# Patient Record
Sex: FEMALE | Race: WHITE | NOT HISPANIC OR LATINO | Employment: FULL TIME | URBAN - METROPOLITAN AREA
[De-identification: names, ages, dates, MRNs, and addresses within clinical notes are randomized per-mention and may not be internally consistent; named-entity substitution may affect disease eponyms.]

---

## 2019-07-26 ENCOUNTER — HOSPITAL ENCOUNTER (OUTPATIENT)
Dept: CARDIOLOGY | Facility: HOSPITAL | Age: 24
Discharge: HOME | End: 2019-07-26
Attending: NURSE PRACTITIONER

## 2021-11-08 ENCOUNTER — OUTSIDE FACILITY SERVICE (OUTPATIENT)
Dept: CARDIOLOGY | Facility: CLINIC | Age: 26
End: 2021-11-08

## 2021-11-08 PROCEDURE — OUTSIDEPOS PR OUTSIDE POS PLACEHOLDER: Performed by: INTERNAL MEDICINE

## 2022-01-12 ENCOUNTER — OFFICE VISIT (OUTPATIENT)
Dept: INTERNAL MEDICINE | Facility: CLINIC | Age: 27
End: 2022-01-12

## 2022-01-12 VITALS
BODY MASS INDEX: 29.77 KG/M2 | SYSTOLIC BLOOD PRESSURE: 113 MMHG | TEMPERATURE: 98.2 F | HEART RATE: 123 BPM | DIASTOLIC BLOOD PRESSURE: 88 MMHG | WEIGHT: 168 LBS | HEIGHT: 63 IN

## 2022-01-12 DIAGNOSIS — F33.1 MODERATE EPISODE OF RECURRENT MAJOR DEPRESSIVE DISORDER: Primary | ICD-10-CM

## 2022-01-12 DIAGNOSIS — I10 PRIMARY HYPERTENSION: ICD-10-CM

## 2022-01-12 DIAGNOSIS — R23.2 FACIAL FLUSHING: ICD-10-CM

## 2022-01-12 DIAGNOSIS — F41.9 ANXIETY: ICD-10-CM

## 2022-01-12 PROCEDURE — 99213 OFFICE O/P EST LOW 20 MIN: CPT | Performed by: NURSE PRACTITIONER

## 2022-01-12 RX ORDER — HYDROXYZINE HYDROCHLORIDE 25 MG/1
25 TABLET, FILM COATED ORAL 3 TIMES DAILY PRN
Qty: 90 TABLET | Refills: 1 | Status: SHIPPED | OUTPATIENT
Start: 2022-01-12

## 2022-01-12 RX ORDER — HYDRALAZINE HYDROCHLORIDE 10 MG/1
10 TABLET, FILM COATED ORAL 3 TIMES DAILY PRN
Qty: 90 TABLET | Refills: 1 | Status: SHIPPED | OUTPATIENT
Start: 2022-01-12 | End: 2022-01-12 | Stop reason: ALTCHOICE

## 2022-01-12 NOTE — PROGRESS NOTES
New Patient Office Visit      Patient Name: Sheyla Vargas  : 1995   MRN: [id number]   Care Team: Patient Care Team:  Brandee Alba APRN as PCP - General (Nurse Practitioner)    Chief Complaint:    Chief Complaint   Patient presents with   • medication review     establishing care       History of Present Illness: Sheyla Vargas is a 26 y.o. female who is here today to establish care.  She has pertinent medical history significant for anxiety, depression, hypertension with associated headaches and facial flushing.    Started job with [employer] approximately 4 to 6 months ago.  Was previously in [place], had PCP at [place]. Wanting to change providers due to convenience of location.    Last visit with previous PCP was 10/21.     Depression/anxiety- has been sleeping well over last several weeks. Worried about things.   Working at hospital and with covid, lots of extra shifts, working short-handed.  Tried sertraline in past, had weight gain and didn't work well to manage symptoms.      Feels anxious during day but staying busy at work/life.  Feel symptoms more so of anxious at night while trying to sleep.  Also has associated palpitations.  She has seen cardiology in the past with normal echo and EKG.    Hasnt tried taking any medications other than prescribed to treat sleep/anxiousness.    Headaches- ongoing, chronic issue.  Usually starts around noon time also correlates with elevated BP that starts around that time as well.      HTN- takes norvasc 5mg nightly and HCTZ 25 mg in mornings.  BP seems to runs okay in the morning time, then increases throughout the day, usually elevated around noon and even higher in the afternoons.      Facial flushing- feels symptoms are worse.  Every day occurrence, starts even in the mornings, goes throughout the day.  Worse after eating.  Admits she has discontinued all caffeine since the start of new year.  Trying to lose weight/changing dietary habits.  Trying to  "add some exercise regimen but not so routine yet.     Had blood work with previous PCP (cesar, sed rate, RF) to rule out auto-immune.           Subjective      Review of Systems:   Review of Systems   Skin:        Positive for facial flushing   Neurological: Positive for headache.   Psychiatric/Behavioral: The patient is nervous/anxious.        Past Medical History:   Past Medical History:   Diagnosis Date   • Anxiety    • Depression    • Hypertension        Past Surgical History:   Past Surgical History:   Procedure Laterality Date   • CHOLECYSTECTOMY  10/31/2017       Family History:   Family History   Problem Relation Age of Onset   • Diabetes Mother    • Obesity Mother    • Obesity Father    • Heart attack Maternal Grandmother        Social History:   Social History     Socioeconomic History   • Marital status: Single   Tobacco Use   • Smoking status: Never Smoker   • Smokeless tobacco: Never Used   Substance and Sexual Activity   • Alcohol use: Never   • Drug use: Never       Tobacco History:   Social History     Tobacco Use   Smoking Status Never Smoker   Smokeless Tobacco Never Used       Medications:     Current Outpatient Medications:   •  amLODIPine (NORVASC) 5 MG tablet, Take 5 mg by mouth Daily., Disp: , Rfl:   •  FLUoxetine (PROzac) 40 MG capsule, , Disp: , Rfl:   •  hydroCHLOROthiazide (HYDRODIURIL) 25 MG tablet, , Disp: , Rfl:   •  triamcinolone (KENALOG) 0.1 % ointment, As Needed., Disp: , Rfl:   •  hydrOXYzine (ATARAX) 25 MG tablet, Take 1 tablet by mouth 3 (Three) Times a Day As Needed for Anxiety., Disp: 90 tablet, Rfl: 1    Allergies:   No Known Allergies    Objective     Physical Exam:  Vital Signs:   Vitals:    01/12/22 1113   BP: 113/88   BP Location: Left arm   Patient Position: Sitting   Cuff Size: Adult   Pulse: (!) 123   Temp: 98.2 °F (36.8 °C)   TempSrc: Temporal   Weight: 76.2 kg (168 lb)   Height: 160 cm (62.99\")   PainSc: 0-No pain     Body mass index is 29.77 kg/m².     Physical " Exam  Vitals and nursing note reviewed.   Constitutional:       General: She is not in acute distress.  HENT:      Head: Normocephalic and atraumatic.   Eyes:      General: No scleral icterus.     Pupils: Pupils are equal, round, and reactive to light.   Neck:      Vascular: No carotid bruit.   Cardiovascular:      Rate and Rhythm: Normal rate and regular rhythm.   Pulmonary:      Effort: Pulmonary effort is normal. No respiratory distress.      Breath sounds: Normal breath sounds. No wheezing.   Musculoskeletal:      Cervical back: Neck supple. No tenderness.      Right lower leg: No edema.      Left lower leg: No edema.   Skin:     Comments: Bilateral cheeks, nose and chin area florentino red without any skin lesions noted.   Neurological:      General: No focal deficit present.      Mental Status: She is alert.   Psychiatric:         Thought Content: Thought content normal.         Judgment: Judgment normal.      Comments: Patient is conversant but quiet.         Assessment / Plan      Assessment/Plan:   Problems Addressed This Visit    ICD-10-CM ICD-9-CM   1. Moderate episode of recurrent major depressive disorder (HCC)  F33.1 296.32   2. Anxiety  F41.9 300.00   3. Facial flushing  R23.2 782.62   4. Primary hypertension  I10 401.9     New patient to establish    Hypertension with associated headache  -Appears to be possibly related to her anxiety as her BP and headache spike midday  -Continue Norvasc 5 mg daily, HCTZ 25 mg daily  -Monitor BP closely, record and bring log to next appointment    Anxiety/ depression  -Continue Prozac 40 mg daily  -Adding hydroxyzine 25 mg 3 times daily as needed as this may help control her anxiousness as well as have some effect on her blood pressure    Facial  -Appears SEJAL, RF and sed rate within normal range  -Questionable if this is rosacea but may also be a result of her anxiety  - may consider dermatology referral in future if this persists despite gaining more optimal control of  depression/anxiety and hypertension            Plan of care reviewed with patient at the conclusion of today's visit. Education was provided regarding diagnosis and management.  Patient verbalizes understanding of and agreement with management plan.    There are no Patient Instructions on file for this visit.    Follow Up:   Return in about 1 month (around 2/12/2022).          MICHAEL Thomas  [facility] [address] [facility]    Please note that portions of this note were completed with a voice recognition program.

## 2022-01-12 NOTE — PATIENT INSTRUCTIONS
"[email or web address]">   Generalized Anxiety Disorder, Adult  Generalized anxiety disorder (NANDO) is a mental health condition. Unlike normal worries, anxiety related to NANDO is not triggered by a specific event. These worries do not fade or get better with time. NANDO interferes with relationships, work, and school.  NANDO symptoms can vary from mild to severe. People with severe NANDO can have intense waves of anxiety with physical symptoms that are similar to panic attacks.  What are the causes?  The exact cause of NANDO is not known, but the following are believed to have an impact:  · Differences in natural brain chemicals.  · Genes passed down from parents to children.  · Differences in the way threats are perceived.  · Development during childhood.  · Personality.  What increases the risk?  The following factors may make you more likely to develop this condition:  · Being female.  · Having a family history of anxiety disorders.  · Being very shy.  · Experiencing very stressful life events, such as the death of a loved one.  · Having a very stressful family environment.  What are the signs or symptoms?  People with NANDO often worry excessively about many things in their lives, such as their health and family. Symptoms may also include:  · Mental and emotional symptoms:  ? Worrying excessively about natural disasters.  ? Fear of being late.  ? Difficulty concentrating.  ? Fears that others are judging your performance.  · Physical symptoms:  ? Fatigue.  ? Headaches, muscle tension, muscle twitches, trembling, or feeling shaky.  ? Feeling like your heart is pounding or beating very fast.  ? Feeling out of breath or like you cannot take a deep breath.  ? Having trouble falling asleep or staying asleep, or experiencing restlessness.  ? Sweating.  ? Nausea, diarrhea, or irritable bowel syndrome (IBS).  · Behavioral symptoms:  ? Experiencing erratic moods or irritability.  ? Avoidance of new situations.  ? Avoidance of " people.  ? Extreme difficulty making decisions.  How is this diagnosed?  This condition is diagnosed based on your symptoms and medical history. You will also have a physical exam. Your health care provider may perform tests to rule out other possible causes of your symptoms.  To be diagnosed with NANDO, a person must have anxiety that:  · Is out of his or her control.  · Affects several different aspects of his or her life, such as work and relationships.  · Causes distress that makes him or her unable to take part in normal activities.  · Includes at least three symptoms of NANDO, such as restlessness, fatigue, trouble concentrating, irritability, muscle tension, or sleep problems.  Before your health care provider can confirm a diagnosis of NANDO, these symptoms must be present more days than they are not, and they must last for 6 months or longer.  How is this treated?  This condition may be treated with:  · Medicine. Antidepressant medicine is usually prescribed for long-term daily control. Anti-anxiety medicines may be added in severe cases, especially when panic attacks occur.  · Talk therapy (psychotherapy). Certain types of talk therapy can be helpful in treating NANDO by providing support, education, and guidance. Options include:  ? Cognitive behavioral therapy (CBT). People learn coping skills and self-calming techniques to ease their physical symptoms. They learn to identify unrealistic thoughts and behaviors and to replace them with more appropriate thoughts and behaviors.  ? Acceptance and commitment therapy (ACT). This treatment teaches people how to be mindful as a way to cope with unwanted thoughts and feelings.  ? Biofeedback. This process trains you to manage your body's response (physiological response) through breathing techniques and relaxation methods. You will work with a therapist while machines are used to monitor your physical symptoms.  · Stress management techniques. These include yoga,  meditation, and exercise.  A mental health specialist can help determine which treatment is best for you. Some people see improvement with one type of therapy. However, other people require a combination of therapies.  Follow these instructions at home:  Lifestyle  · Maintain a consistent routine and schedule.  · Anticipate stressful situations. Create a plan, and allow extra time to work with your plan.  · Practice stress management or self-calming techniques that you have learned from your therapist or your health care provider.  General instructions  · Take over-the-counter and prescription medicines only as told by your health care provider.  · Understand that you are likely to have setbacks. Accept this and be kind to yourself as you persist to take better care of yourself.  · Recognize and accept your accomplishments, even if you  them as small.  · Keep all follow-up visits as told by your health care provider. This is important.  Contact a health care provider if:  · Your symptoms do not get better.  · Your symptoms get worse.  · You have signs of depression, such as:  ? A persistently sad or irritable mood.  ? Loss of enjoyment in activities that used to bring you christi.  ? Change in weight or eating.  ? Changes in sleeping habits.  ? Avoiding friends or family members.  ? Loss of energy for normal tasks.  ? Feelings of guilt or worthlessness.  Get help right away if:  · You have serious thoughts about hurting yourself or others.  If you ever feel like you may hurt yourself or others, or have thoughts about taking your own life, get help right away. Go to your nearest emergency department or:  · Call your local emergency services (781 in the [place]  · Call a suicide crisis helpline, such as the National Suicide Prevention Lifeline at 1-454.620.8066. This is open 24 hours a day in the [place]  · Text the Crisis Text Line at 357216 (in the [place]  Summary  · Generalized anxiety disorder (NANDO) is a mental  health condition that involves worry that is not triggered by a specific event.  · People with NANDO often worry excessively about many things in their lives, such as their health and family.  · NANDO may cause symptoms such as restlessness, trouble concentrating, sleep problems, frequent sweating, nausea, diarrhea, headaches, and trembling or muscle twitching.  · A mental health specialist can help determine which treatment is best for you. Some people see improvement with one type of therapy. However, other people require a combination of therapies.  This information is not intended to replace advice given to you by your health care provider. Make sure you discuss any questions you have with your health care provider.  Document Revised: 10/07/2020 Document Reviewed: 10/07/2020  Elsevier Patient Education © 2021 [employer]

## 2022-01-19 ENCOUNTER — PATIENT EDUCATION (SURGERY INSTRUCTIONS) (OUTPATIENT)
Dept: INTERNAL MEDICINE | Facility: CLINIC | Age: 27
End: 2022-01-19

## 2022-01-19 ENCOUNTER — PATIENT ROUNDING (BHMG ONLY) (OUTPATIENT)
Dept: INTERNAL MEDICINE | Facility: CLINIC | Age: 27
End: 2022-01-19

## 2022-01-19 NOTE — PROGRESS NOTES
Called patient & had the following conversation:      My name is Shelby Salas & I’m the practice manager here at [facility] with Eleonora Alba's office.    I’m calling as I see you were a new patient with our practice last week & I wanted to officially welcome you & make sure everything went smoothly for you.    Pt stated everything was great & she really liked Eleonora.  Staff interactions were also good.    She did not need anything from me so I told her we would see her back next month but she can call or send a portal if she needs anything before then.

## 2022-02-23 ENCOUNTER — OFFICE VISIT (OUTPATIENT)
Dept: INTERNAL MEDICINE | Facility: CLINIC | Age: 27
End: 2022-02-23

## 2022-02-23 ENCOUNTER — LAB (OUTPATIENT)
Dept: LAB | Facility: HOSPITAL | Age: 27
End: 2022-02-23

## 2022-02-23 VITALS
DIASTOLIC BLOOD PRESSURE: 84 MMHG | HEART RATE: 105 BPM | WEIGHT: 165 LBS | BODY MASS INDEX: 29.23 KG/M2 | OXYGEN SATURATION: 99 % | SYSTOLIC BLOOD PRESSURE: 120 MMHG | TEMPERATURE: 98 F | HEIGHT: 63 IN

## 2022-02-23 DIAGNOSIS — R23.2 FACIAL FLUSHING: ICD-10-CM

## 2022-02-23 DIAGNOSIS — R19.7 DIARRHEA, UNSPECIFIED TYPE: ICD-10-CM

## 2022-02-23 DIAGNOSIS — R51.9 CHRONIC NONINTRACTABLE HEADACHE, UNSPECIFIED HEADACHE TYPE: ICD-10-CM

## 2022-02-23 DIAGNOSIS — I10 PRIMARY HYPERTENSION: ICD-10-CM

## 2022-02-23 DIAGNOSIS — R19.7 DIARRHEA, UNSPECIFIED TYPE: Primary | ICD-10-CM

## 2022-02-23 DIAGNOSIS — F41.9 ANXIETY: ICD-10-CM

## 2022-02-23 DIAGNOSIS — F33.1 MODERATE EPISODE OF RECURRENT MAJOR DEPRESSIVE DISORDER: ICD-10-CM

## 2022-02-23 DIAGNOSIS — G89.29 CHRONIC NONINTRACTABLE HEADACHE, UNSPECIFIED HEADACHE TYPE: ICD-10-CM

## 2022-02-23 LAB
ALBUMIN SERPL-MCNC: 4.6 G/DL (ref 3.5–5.2)
ALBUMIN/GLOB SERPL: 2.3 G/DL
ALP SERPL-CCNC: 60 U/L (ref 39–117)
ALT SERPL W P-5'-P-CCNC: 12 U/L (ref 1–33)
ANION GAP SERPL CALCULATED.3IONS-SCNC: 12.7 MMOL/L (ref 5–15)
AST SERPL-CCNC: 14 U/L (ref 1–32)
BASOPHILS # BLD AUTO: 0.08 10*3/MM3 (ref 0–0.2)
BASOPHILS NFR BLD AUTO: 0.8 % (ref 0–1.5)
BILIRUB SERPL-MCNC: 0.5 MG/DL (ref 0–1.2)
BUN SERPL-MCNC: 12 MG/DL (ref 6–20)
BUN/CREAT SERPL: 15.4 (ref 7–25)
CALCIUM SPEC-SCNC: 9.6 MG/DL (ref 8.6–10.5)
CHLORIDE SERPL-SCNC: 102 MMOL/L (ref 98–107)
CO2 SERPL-SCNC: 22.3 MMOL/L (ref 22–29)
CREAT SERPL-MCNC: 0.78 MG/DL (ref 0.57–1)
DEPRECATED RDW RBC AUTO: 43.3 FL (ref 37–54)
EOSINOPHIL # BLD AUTO: 0.02 10*3/MM3 (ref 0–0.4)
EOSINOPHIL NFR BLD AUTO: 0.2 % (ref 0.3–6.2)
ERYTHROCYTE [DISTWIDTH] IN BLOOD BY AUTOMATED COUNT: 12.8 % (ref 12.3–15.4)
GFR SERPL CREATININE-BSD FRML MDRD: 91 ML/MIN/1.73
GLOBULIN UR ELPH-MCNC: 2 GM/DL
GLUCOSE SERPL-MCNC: 79 MG/DL (ref 65–99)
HCT VFR BLD AUTO: 45.8 % (ref 34–46.6)
HGB BLD-MCNC: 14.9 G/DL (ref 12–15.9)
IMM GRANULOCYTES # BLD AUTO: 0.02 10*3/MM3 (ref 0–0.05)
IMM GRANULOCYTES NFR BLD AUTO: 0.2 % (ref 0–0.5)
LYMPHOCYTES # BLD AUTO: 1.47 10*3/MM3 (ref 0.7–3.1)
LYMPHOCYTES NFR BLD AUTO: 15 % (ref 19.6–45.3)
MCH RBC QN AUTO: 29.9 PG (ref 26.6–33)
MCHC RBC AUTO-ENTMCNC: 32.5 G/DL (ref 31.5–35.7)
MCV RBC AUTO: 91.8 FL (ref 79–97)
MONOCYTES # BLD AUTO: 0.67 10*3/MM3 (ref 0.1–0.9)
MONOCYTES NFR BLD AUTO: 6.8 % (ref 5–12)
NEUTROPHILS NFR BLD AUTO: 7.56 10*3/MM3 (ref 1.7–7)
NEUTROPHILS NFR BLD AUTO: 77 % (ref 42.7–76)
NRBC BLD AUTO-RTO: 0 /100 WBC (ref 0–0.2)
PLATELET # BLD AUTO: 345 10*3/MM3 (ref 140–450)
PMV BLD AUTO: 11.3 FL (ref 6–12)
POTASSIUM SERPL-SCNC: 4.5 MMOL/L (ref 3.5–5.2)
PROT SERPL-MCNC: 6.6 G/DL (ref 6–8.5)
RBC # BLD AUTO: 4.99 10*6/MM3 (ref 3.77–5.28)
SODIUM SERPL-SCNC: 137 MMOL/L (ref 136–145)
WBC NRBC COR # BLD: 9.82 10*3/MM3 (ref 3.4–10.8)

## 2022-02-23 PROCEDURE — 80053 COMPREHEN METABOLIC PANEL: CPT

## 2022-02-23 PROCEDURE — 99213 OFFICE O/P EST LOW 20 MIN: CPT | Performed by: NURSE PRACTITIONER

## 2022-02-23 PROCEDURE — 85025 COMPLETE CBC W/AUTO DIFF WBC: CPT

## 2022-02-23 RX ORDER — AMLODIPINE BESYLATE 10 MG/1
10 TABLET ORAL DAILY
Qty: 30 TABLET | Refills: 3 | Status: SHIPPED | OUTPATIENT
Start: 2022-02-23

## 2022-02-23 RX ORDER — RIZATRIPTAN BENZOATE 10 MG/1
10 TABLET ORAL ONCE AS NEEDED
Qty: 15 TABLET | Refills: 2 | Status: SHIPPED | OUTPATIENT
Start: 2022-02-23

## 2022-02-23 NOTE — PROGRESS NOTES
Follow Up Office Visit      Patient Name: Sheyla Vargas  : 1995   MRN: [id number]   Care Team: Patient Care Team:  Brandee Alba APRN as PCP - General (Nurse Practitioner)    Chief Complaint:    Chief Complaint   Patient presents with   • Follow-up       History of Present Illness: Sheyla Vargas is a 26 y.o. female with pertinent medical history significant for hypertension, facial flushing chronic headaches, depression and anxiety.  She presents today for follow-up of these conditions as well as new issue of frequent diarrhea    Headaches, sleep disturbance, elevated blood pressure, facial flushing-at last visit, she was prescribed course of hydroxyzine 25 mg 3 times daily for what was thought to be elevated blood pressure, tension headaches, facial flushing sleep disturbance related to stress and anxiety with her new work situation.    She tells me today that her sleep is much improved with hydroxyzine, she is taking it nightly and sleeping on average of 7 to 8 hours restfully.  However, she is unable to take doses throughout the day as it causes her to feel drowsy.    She continues to experience almost daily headaches in the afternoon with associated facial flushing.  She admits that she has not been checking her blood pressure routinely.  She is continuing to take Norvasc 5 mg daily and after chlorothiazide 25 mg daily.  States that she takes ibuprofen and Tylenol without relief of her headaches.  Most days are rated 6-7/10 on pain scale but occasionally she has some episodic headaches that reached 8-10.  On most days, she does report having light sensitivity.  Noted that caffeine was discontinued but there was no improvement in the symptoms.    She reports that she does feel a great deal of stress and some ongoing anxiousness at work but it is improving overall, not worse at this time.      Diarrhea-noted to be status post cholecystectomy .  However, reports that prior to Norbert, she  has started having very frequent liquid stools, approximately 4-5 times daily.  She is trying to adjust her diet but this has not improved her symptoms.  Reports that she feels cramping and need to have BM with almost every food intake.  She has trialed medications over-the-counter such as Pepto without any relief of symptoms. Reports stools seem oily.  She denies having any viral illnesses or concerns for food poisoning.  Denies associated nausea, vomiting and or fever, chills.         Subjective      Review of Systems:   Review of Systems   Constitutional: Negative for chills and fever.   Respiratory: Negative for shortness of breath.    Cardiovascular: Negative for chest pain and palpitations.   Gastrointestinal: Positive for abdominal pain (Cramping) and diarrhea.   Neurological: Positive for headache.   Psychiatric/Behavioral: Positive for stress.       I have reviewed and the following portions of the patient's history were updated as appropriate: past family history, past medical history, past social history, past surgical history and problem list.    Medications:     Current Outpatient Medications:   •  FLUoxetine (PROzac) 40 MG capsule, , Disp: , Rfl:   •  hydroCHLOROthiazide (HYDRODIURIL) 25 MG tablet, , Disp: , Rfl:   •  hydrOXYzine (ATARAX) 25 MG tablet, Take 1 tablet by mouth 3 (Three) Times a Day As Needed for Anxiety., Disp: 90 tablet, Rfl: 1  •  triamcinolone (KENALOG) 0.1 % ointment, As Needed., Disp: , Rfl:   •  amLODIPine (Norvasc) 10 MG tablet, Take 1 tablet by mouth Daily., Disp: 30 tablet, Rfl: 3  •  rizatriptan (Maxalt) 10 MG tablet, Take 1 tablet by mouth 1 (One) Time As Needed for Migraine for up to 1 dose. May repeat in 2 hours if needed, Disp: 15 tablet, Rfl: 2    Allergies:   No Known Allergies    Objective     Physical Exam:  Vital Signs:   Vitals:    02/23/22 1037   BP: 120/84   BP Location: Left arm   Patient Position: Sitting   Cuff Size: Adult   Pulse: 105   Temp: 98 °F (36.7 °C)  "  TempSrc: Temporal   SpO2: 99%   Weight: 74.8 kg (165 lb)   Height: 160 cm (62.99\")   PainSc: 0-No pain     Body mass index is 29.24 kg/m².     Physical Exam  Vitals and nursing note reviewed.   Constitutional:       General: She is not in acute distress.     Appearance: Normal appearance.   HENT:      Head: Normocephalic and atraumatic.      Comments: Patient wearing facial mask  Eyes:      General: No scleral icterus.     Extraocular Movements: Extraocular movements intact.      Conjunctiva/sclera: Conjunctivae normal.      Pupils: Pupils are equal, round, and reactive to light.   Cardiovascular:      Rate and Rhythm: Normal rate and regular rhythm.   Pulmonary:      Effort: Pulmonary effort is normal. No respiratory distress.   Abdominal:      General: Bowel sounds are normal.   Musculoskeletal:      Right lower leg: No edema.      Left lower leg: No edema.   Skin:     General: Skin is warm and dry.   Neurological:      Mental Status: She is alert.      Cranial Nerves: No cranial nerve deficit.   Psychiatric:         Mood and Affect: Mood normal.         Thought Content: Thought content normal.         Judgment: Judgment normal.         Assessment / Plan      Assessment/Plan:   Problems Addressed This Visit    ICD-10-CM ICD-9-CM   1. Diarrhea, unspecified type  R19.7 787.91   2. Moderate episode of recurrent major depressive disorder (HCC)  F33.1 296.32   3. Anxiety  F41.9 300.00   4. Facial flushing  R23.2 782.62   5. Primary hypertension  I10 401.9   6. Chronic nonintractable headache, unspecified headache type  R51.9 784.0    G89.29       Diarrhea  -Status post: 2013 but patient states symptoms changed  -Stool culture/studies with O&P  -CBC CMP  -May need to consider allergy testing if above tests are within normal limits    Hypertension  Facial flushing  Chronic headaches  -Increasing Norvasc to 10 mg daily, continue hydrochlorothiazide 25 mg daily  -Trial of Maxalt 10 mg x 1 dose and repeat in 2 hours as " "needed  -Advised patient to restart checking her blood pressure at the onset of headache and when headaches are severe.  Questionable whether her pain is causing elevated BPs versus elevated BP causing headaches  -I do feel that her symptoms have component of relation to emotional stress and anxiety    Sleep disturbance  -May continue hydroxyzine 25 mg nightly    Plan of care reviewed with patient at the conclusion of today's visit. Education was provided regarding diagnosis and management.  Patient verbalizes understanding of and agreement with management plan.    Patient Instructions   Hypertension, Adult  High blood pressure (hypertension) is when the force of blood pumping through the arteries is too strong. The arteries are the blood vessels that carry blood from the heart throughout the body. Hypertension forces the heart to work harder to pump blood and may cause arteries to become narrow or stiff. Untreated or uncontrolled hypertension can cause a heart attack, heart failure, a stroke, kidney disease, and other problems.  A blood pressure reading consists of a higher number over a lower number. Ideally, your blood pressure should be below 120/80. The first (\"top\") number is called the systolic pressure. It is a measure of the pressure in your arteries as your heart beats. The second (\"bottom\") number is called the diastolic pressure. It is a measure of the pressure in your arteries as the heart relaxes.  What are the causes?  The exact cause of this condition is not known. There are some conditions that result in or are related to high blood pressure.  What increases the risk?  Some risk factors for high blood pressure are under your control. The following factors may make you more likely to develop this condition:  Smoking.  Having type 2 diabetes mellitus, high cholesterol, or both.  Not getting enough exercise or physical activity.  Being overweight.  Having too much fat, sugar, calories, or salt (sodium) " in your diet.  Drinking too much alcohol.  Some risk factors for high blood pressure may be difficult or impossible to change. Some of these factors include:  Having chronic kidney disease.  Having a family history of high blood pressure.  Age. Risk increases with age.  Race. You may be at higher risk if you are .  Gender. Men are at higher risk than women before age 45. After age 65, women are at higher risk than men.  Having obstructive sleep apnea.  Stress.  What are the signs or symptoms?  High blood pressure may not cause symptoms. Very high blood pressure (hypertensive crisis) may cause:  Headache.  Anxiety.  Shortness of breath.  Nosebleed.  Nausea and vomiting.  Vision changes.  Severe chest pain.  Seizures.  How is this diagnosed?  This condition is diagnosed by measuring your blood pressure while you are seated, with your arm resting on a flat surface, your legs uncrossed, and your feet flat on the floor. The cuff of the blood pressure monitor will be placed directly against the skin of your upper arm at the level of your heart. It should be measured at least twice using the same arm. Certain conditions can cause a difference in blood pressure between your right and left arms.  Certain factors can cause blood pressure readings to be lower or higher than normal for a short period of time:  When your blood pressure is higher when you are in a health care provider's office than when you are at home, this is called white coat hypertension. Most people with this condition do not need medicines.  When your blood pressure is higher at home than when you are in a health care provider's office, this is called masked hypertension. Most people with this condition may need medicines to control blood pressure.  If you have a high blood pressure reading during one visit or you have normal blood pressure with other risk factors, you may be asked to:  Return on a different day to have your blood pressure  checked again.  Monitor your blood pressure at home for 1 week or longer.  If you are diagnosed with hypertension, you may have other blood or imaging tests to help your health care provider understand your overall risk for other conditions.  How is this treated?  This condition is treated by making healthy lifestyle changes, such as eating healthy foods, exercising more, and reducing your alcohol intake. Your health care provider may prescribe medicine if lifestyle changes are not enough to get your blood pressure under control, and if:  Your systolic blood pressure is above 130.  Your diastolic blood pressure is above 80.  Your personal target blood pressure may vary depending on your medical conditions, your age, and other factors.  Follow these instructions at home:  Eating and drinking    Eat a diet that is high in fiber and potassium, and low in sodium, added sugar, and fat. An example eating plan is called the DASH (Dietary Approaches to Stop Hypertension) diet. To eat this way:  Eat plenty of fresh fruits and vegetables. Try to fill one half of your plate at each meal with fruits and vegetables.  Eat whole grains, such as whole-wheat pasta, brown rice, or whole-grain bread. Fill about one fourth of your plate with whole grains.  Eat or drink low-fat dairy products, such as skim milk or low-fat yogurt.  Avoid fatty cuts of meat, processed or cured meats, and poultry with skin. Fill about one fourth of your plate with lean proteins, such as fish, chicken without skin, beans, eggs, or tofu.  Avoid pre-made and processed foods. These tend to be higher in sodium, added sugar, and fat.  Reduce your daily sodium intake. Most people with hypertension should eat less than 1,500 mg of sodium a day.  Do not drink alcohol if:  Your health care provider tells you not to drink.  You are pregnant, may be pregnant, or are planning to become pregnant.  If you drink alcohol:  Limit how much you use to:  0-1 drink a day for  women.  0-2 drinks a day for men.  Be aware of how much alcohol is in your drink. In the [place] one drink equals one 12 oz bottle of beer (355 mL), one 5 oz glass of wine (148 mL), or one 1½ oz glass of hard liquor (44 mL).    Lifestyle    Work with your health care provider to maintain a healthy body weight or to lose weight. Ask what an ideal weight is for you.  Get at least 30 minutes of exercise most days of the week. Activities may include walking, swimming, or biking.  Include exercise to strengthen your muscles (resistance exercise), such as Pilates or lifting weights, as part of your weekly exercise routine. Try to do these types of exercises for 30 minutes at least 3 days a week.  Do not use any products that contain nicotine or tobacco, such as cigarettes, e-cigarettes, and chewing tobacco. If you need help quitting, ask your health care provider.  Monitor your blood pressure at home as told by your health care provider.  Keep all follow-up visits as told by your health care provider. This is important.    Medicines  Take over-the-counter and prescription medicines only as told by your health care provider. Follow directions carefully. Blood pressure medicines must be taken as prescribed.  Do not skip doses of blood pressure medicine. Doing this puts you at risk for problems and can make the medicine less effective.  Ask your health care provider about side effects or reactions to medicines that you should watch for.  Contact a health care provider if you:  Think you are having a reaction to a medicine you are taking.  Have headaches that keep coming back (recurring).  Feel dizzy.  Have swelling in your ankles.  Have trouble with your vision.  Get help right away if you:  Develop a severe headache or confusion.  Have unusual weakness or numbness.  Feel faint.  Have severe pain in your chest or abdomen.  Vomit repeatedly.  Have trouble breathing.  Summary  Hypertension is when the force of blood pumping  through your arteries is too strong. If this condition is not controlled, it may put you at risk for serious complications.  Your personal target blood pressure may vary depending on your medical conditions, your age, and other factors. For most people, a normal blood pressure is less than 120/80.  Hypertension is treated with lifestyle changes, medicines, or a combination of both. Lifestyle changes include losing weight, eating a healthy, low-sodium diet, exercising more, and limiting alcohol.  This information is not intended to replace advice given to you by your health care provider. Make sure you discuss any questions you have with your health care provider.  Document Revised: 08/28/2019 Document Reviewed: 08/28/2019  [employer] Patient Education © 2021 [employer]  General Headache Without Cause  A headache is pain or discomfort felt around the head or neck area. The specific cause of a headache may not be found. There are many causes and types of headaches. A few common ones are:  Tension headaches.  Migraine headaches.  Cluster headaches.  Chronic daily headaches.  Follow these instructions at home:  Watch your condition for any changes. Let your health care provider know about them. Take these steps to help with your condition:  Managing pain         Take over-the-counter and prescription medicines only as told by your health care provider.  Lie down in a dark, quiet room when you have a headache.  If directed, put ice on your head and neck area:  Put ice in a plastic bag.  Place a towel between your skin and the bag.  Leave the ice on for 20 minutes, 2-3 times per day.  If directed, apply heat to the affected area. Use the heat source that your health care provider recommends, such as a moist heat pack or a heating pad.  Place a towel between your skin and the heat source.  Leave the heat on for 20-30 minutes.  Remove the heat if your skin turns bright red. This is especially important if you are unable to  feel pain, heat, or cold. You may have a greater risk of getting burned.  Keep lights dim if bright lights bother you or make your headaches worse.  Eating and drinking  Eat meals on a regular schedule.  If you drink alcohol:  Limit how much you use to:  0-1 drink a day for women.  0-2 drinks a day for men.  Be aware of how much alcohol is in your drink. In the [place] one drink equals one 12 oz bottle of beer (355 mL), one 5 oz glass of wine (148 mL), or one 1½ oz glass of hard liquor (44 mL).  Stop drinking caffeine, or decrease the amount of caffeine you drink.  General instructions    Keep a headache journal to help find out what may trigger your headaches. For example, write down:  What you eat and drink.  How much sleep you get.  Any change to your diet or medicines.  Try massage or other relaxation techniques.  Limit stress.  Sit up straight, and do not tense your muscles.  Do not use any products that contain nicotine or tobacco, such as cigarettes, e-cigarettes, and chewing tobacco. If you need help quitting, ask your health care provider.  Exercise regularly as told by your health care provider.  Sleep on a regular schedule. Get 7-9 hours of sleep each night, or the amount recommended by your health care provider.  Keep all follow-up visits as told by your health care provider. This is important.    Contact a health care provider if:  Your symptoms are not helped by medicine.  You have a headache that is different from the usual headache.  You have nausea or you vomit.  You have a fever.  Get help right away if:  Your headache becomes severe quickly.  Your headache gets worse after moderate to intense physical activity.  You have repeated vomiting.  You have a stiff neck.  You have a loss of vision.  You have problems with speech.  You have pain in the eye or ear.  You have muscular weakness or loss of muscle control.  You lose your balance or have trouble walking.  You feel faint or pass out.  You have  confusion.  You have a seizure.  Summary  A headache is pain or discomfort felt around the head or neck area.  There are many causes and types of headaches. In some cases, the cause may not be found.  Keep a headache journal to help find out what may trigger your headaches. Watch your condition for any changes. Let your health care provider know about them.  Contact a health care provider if you have a headache that is different from the usual headache, or if your symptoms are not helped by medicine.  Get help right away if your headache becomes severe, you vomit, you have a loss of vision, you lose your balance, or you have a seizure.  This information is not intended to replace advice given to you by your health care provider. Make sure you discuss any questions you have with your health care provider.  Document Revised: 07/08/2019 Document Reviewed: 07/08/2019  [employer] Patient Education © 2021 [employer]        Follow Up:   Return in about 3 months (around 5/23/2022).        MICHAEL Thomas  [facility] [address]    Please note that portions of this note were completed with a voice recognition program.

## 2022-02-23 NOTE — PATIENT INSTRUCTIONS
"Hypertension, Adult  High blood pressure (hypertension) is when the force of blood pumping through the arteries is too strong. The arteries are the blood vessels that carry blood from the heart throughout the body. Hypertension forces the heart to work harder to pump blood and may cause arteries to become narrow or stiff. Untreated or uncontrolled hypertension can cause a heart attack, heart failure, a stroke, kidney disease, and other problems.  A blood pressure reading consists of a higher number over a lower number. Ideally, your blood pressure should be below 120/80. The first (\"top\") number is called the systolic pressure. It is a measure of the pressure in your arteries as your heart beats. The second (\"bottom\") number is called the diastolic pressure. It is a measure of the pressure in your arteries as the heart relaxes.  What are the causes?  The exact cause of this condition is not known. There are some conditions that result in or are related to high blood pressure.  What increases the risk?  Some risk factors for high blood pressure are under your control. The following factors may make you more likely to develop this condition:  · Smoking.  · Having type 2 diabetes mellitus, high cholesterol, or both.  · Not getting enough exercise or physical activity.  · Being overweight.  · Having too much fat, sugar, calories, or salt (sodium) in your diet.  · Drinking too much alcohol.  Some risk factors for high blood pressure may be difficult or impossible to change. Some of these factors include:  · Having chronic kidney disease.  · Having a family history of high blood pressure.  · Age. Risk increases with age.  · Race. You may be at higher risk if you are .  · Gender. Men are at higher risk than women before age 45. After age 65, women are at higher risk than men.  · Having obstructive sleep apnea.  · Stress.  What are the signs or symptoms?  High blood pressure may not cause symptoms. Very high " blood pressure (hypertensive crisis) may cause:  · Headache.  · Anxiety.  · Shortness of breath.  · Nosebleed.  · Nausea and vomiting.  · Vision changes.  · Severe chest pain.  · Seizures.  How is this diagnosed?  This condition is diagnosed by measuring your blood pressure while you are seated, with your arm resting on a flat surface, your legs uncrossed, and your feet flat on the floor. The cuff of the blood pressure monitor will be placed directly against the skin of your upper arm at the level of your heart. It should be measured at least twice using the same arm. Certain conditions can cause a difference in blood pressure between your right and left arms.  Certain factors can cause blood pressure readings to be lower or higher than normal for a short period of time:  · When your blood pressure is higher when you are in a health care provider's office than when you are at home, this is called white coat hypertension. Most people with this condition do not need medicines.  · When your blood pressure is higher at home than when you are in a health care provider's office, this is called masked hypertension. Most people with this condition may need medicines to control blood pressure.  If you have a high blood pressure reading during one visit or you have normal blood pressure with other risk factors, you may be asked to:  · Return on a different day to have your blood pressure checked again.  · Monitor your blood pressure at home for 1 week or longer.  If you are diagnosed with hypertension, you may have other blood or imaging tests to help your health care provider understand your overall risk for other conditions.  How is this treated?  This condition is treated by making healthy lifestyle changes, such as eating healthy foods, exercising more, and reducing your alcohol intake. Your health care provider may prescribe medicine if lifestyle changes are not enough to get your blood pressure under control, and  if:  · Your systolic blood pressure is above 130.  · Your diastolic blood pressure is above 80.  Your personal target blood pressure may vary depending on your medical conditions, your age, and other factors.  Follow these instructions at home:  Eating and drinking    · Eat a diet that is high in fiber and potassium, and low in sodium, added sugar, and fat. An example eating plan is called the DASH (Dietary Approaches to Stop Hypertension) diet. To eat this way:  ? Eat plenty of fresh fruits and vegetables. Try to fill one half of your plate at each meal with fruits and vegetables.  ? Eat whole grains, such as whole-wheat pasta, brown rice, or whole-grain bread. Fill about one fourth of your plate with whole grains.  ? Eat or drink low-fat dairy products, such as skim milk or low-fat yogurt.  ? Avoid fatty cuts of meat, processed or cured meats, and poultry with skin. Fill about one fourth of your plate with lean proteins, such as fish, chicken without skin, beans, eggs, or tofu.  ? Avoid pre-made and processed foods. These tend to be higher in sodium, added sugar, and fat.  · Reduce your daily sodium intake. Most people with hypertension should eat less than 1,500 mg of sodium a day.  · Do not drink alcohol if:  ? Your health care provider tells you not to drink.  ? You are pregnant, may be pregnant, or are planning to become pregnant.  · If you drink alcohol:  ? Limit how much you use to:  § 0-1 drink a day for women.  § 0-2 drinks a day for men.  ? Be aware of how much alcohol is in your drink. In the [place] one drink equals one 12 oz bottle of beer (355 mL), one 5 oz glass of wine (148 mL), or one 1½ oz glass of hard liquor (44 mL).    Lifestyle    · Work with your health care provider to maintain a healthy body weight or to lose weight. Ask what an ideal weight is for you.  · Get at least 30 minutes of exercise most days of the week. Activities may include walking, swimming, or biking.  · Include exercise to  strengthen your muscles (resistance exercise), such as Pilates or lifting weights, as part of your weekly exercise routine. Try to do these types of exercises for 30 minutes at least 3 days a week.  · Do not use any products that contain nicotine or tobacco, such as cigarettes, e-cigarettes, and chewing tobacco. If you need help quitting, ask your health care provider.  · Monitor your blood pressure at home as told by your health care provider.  · Keep all follow-up visits as told by your health care provider. This is important.    Medicines  · Take over-the-counter and prescription medicines only as told by your health care provider. Follow directions carefully. Blood pressure medicines must be taken as prescribed.  · Do not skip doses of blood pressure medicine. Doing this puts you at risk for problems and can make the medicine less effective.  · Ask your health care provider about side effects or reactions to medicines that you should watch for.  Contact a health care provider if you:  · Think you are having a reaction to a medicine you are taking.  · Have headaches that keep coming back (recurring).  · Feel dizzy.  · Have swelling in your ankles.  · Have trouble with your vision.  Get help right away if you:  · Develop a severe headache or confusion.  · Have unusual weakness or numbness.  · Feel faint.  · Have severe pain in your chest or abdomen.  · Vomit repeatedly.  · Have trouble breathing.  Summary  · Hypertension is when the force of blood pumping through your arteries is too strong. If this condition is not controlled, it may put you at risk for serious complications.  · Your personal target blood pressure may vary depending on your medical conditions, your age, and other factors. For most people, a normal blood pressure is less than 120/80.  · Hypertension is treated with lifestyle changes, medicines, or a combination of both. Lifestyle changes include losing weight, eating a healthy, low-sodium diet,  exercising more, and limiting alcohol.  This information is not intended to replace advice given to you by your health care provider. Make sure you discuss any questions you have with your health care provider.  Document Revised: 08/28/2019 Document Reviewed: 08/28/2019  Elsevier Patient Education © 2021 [employer]  General Headache Without Cause  A headache is pain or discomfort felt around the head or neck area. The specific cause of a headache may not be found. There are many causes and types of headaches. A few common ones are:  · Tension headaches.  · Migraine headaches.  · Cluster headaches.  · Chronic daily headaches.  Follow these instructions at home:  Watch your condition for any changes. Let your health care provider know about them. Take these steps to help with your condition:  Managing pain         · Take over-the-counter and prescription medicines only as told by your health care provider.  · Lie down in a dark, quiet room when you have a headache.  · If directed, put ice on your head and neck area:  ? Put ice in a plastic bag.  ? Place a towel between your skin and the bag.  ? Leave the ice on for 20 minutes, 2-3 times per day.  · If directed, apply heat to the affected area. Use the heat source that your health care provider recommends, such as a moist heat pack or a heating pad.  ? Place a towel between your skin and the heat source.  ? Leave the heat on for 20-30 minutes.  ? Remove the heat if your skin turns bright red. This is especially important if you are unable to feel pain, heat, or cold. You may have a greater risk of getting burned.  · Keep lights dim if bright lights bother you or make your headaches worse.  Eating and drinking  · Eat meals on a regular schedule.  · If you drink alcohol:  ? Limit how much you use to:  § 0-1 drink a day for women.  § 0-2 drinks a day for men.  ? Be aware of how much alcohol is in your drink. In the [place] one drink equals one 12 oz bottle of beer (355  mL), one 5 oz glass of wine (148 mL), or one 1½ oz glass of hard liquor (44 mL).  · Stop drinking caffeine, or decrease the amount of caffeine you drink.  General instructions    · Keep a headache journal to help find out what may trigger your headaches. For example, write down:  ? What you eat and drink.  ? How much sleep you get.  ? Any change to your diet or medicines.  · Try massage or other relaxation techniques.  · Limit stress.  · Sit up straight, and do not tense your muscles.  · Do not use any products that contain nicotine or tobacco, such as cigarettes, e-cigarettes, and chewing tobacco. If you need help quitting, ask your health care provider.  · Exercise regularly as told by your health care provider.  · Sleep on a regular schedule. Get 7-9 hours of sleep each night, or the amount recommended by your health care provider.  · Keep all follow-up visits as told by your health care provider. This is important.    Contact a health care provider if:  · Your symptoms are not helped by medicine.  · You have a headache that is different from the usual headache.  · You have nausea or you vomit.  · You have a fever.  Get help right away if:  · Your headache becomes severe quickly.  · Your headache gets worse after moderate to intense physical activity.  · You have repeated vomiting.  · You have a stiff neck.  · You have a loss of vision.  · You have problems with speech.  · You have pain in the eye or ear.  · You have muscular weakness or loss of muscle control.  · You lose your balance or have trouble walking.  · You feel faint or pass out.  · You have confusion.  · You have a seizure.  Summary  · A headache is pain or discomfort felt around the head or neck area.  · There are many causes and types of headaches. In some cases, the cause may not be found.  · Keep a headache journal to help find out what may trigger your headaches. Watch your condition for any changes. Let your health care provider know about  them.  · Contact a health care provider if you have a headache that is different from the usual headache, or if your symptoms are not helped by medicine.  · Get help right away if your headache becomes severe, you vomit, you have a loss of vision, you lose your balance, or you have a seizure.  This information is not intended to replace advice given to you by your health care provider. Make sure you discuss any questions you have with your health care provider.  Document Revised: 07/08/2019 Document Reviewed: 07/08/2019  Elsevier Patient Education © 2021 [employer]

## 2022-02-25 ENCOUNTER — APPOINTMENT (OUTPATIENT)
Dept: LAB | Facility: HOSPITAL | Age: 27
End: 2022-02-25

## 2022-02-25 ENCOUNTER — LAB (OUTPATIENT)
Dept: LAB | Facility: HOSPITAL | Age: 27
End: 2022-02-25

## 2022-02-25 LAB — WBC STL QL MICRO: NORMAL

## 2022-02-25 PROCEDURE — 87427 SHIGA-LIKE TOXIN AG IA: CPT

## 2022-02-25 PROCEDURE — 87209 SMEAR COMPLEX STAIN: CPT

## 2022-02-25 PROCEDURE — 87045 FECES CULTURE AEROBIC BACT: CPT

## 2022-02-25 PROCEDURE — 87046 STOOL CULTR AEROBIC BACT EA: CPT

## 2022-02-25 PROCEDURE — 87177 OVA AND PARASITES SMEARS: CPT

## 2022-02-25 PROCEDURE — 87205 SMEAR GRAM STAIN: CPT

## 2022-02-28 LAB
O+P SPEC MICRO: NORMAL
O+P STL TRI STN: NORMAL

## 2022-03-01 LAB
BACTERIA SPEC CULT: NORMAL
BACTERIA SPEC CULT: NORMAL
CAMPYLOBACTER STL CULT: NORMAL
E COLI SXT STL QL IA: NEGATIVE
SALM + SHIG STL CULT: NORMAL

## 2022-03-02 ENCOUNTER — TELEPHONE (OUTPATIENT)
Dept: INTERNAL MEDICINE | Facility: CLINIC | Age: 27
End: 2022-03-02

## 2022-03-02 NOTE — TELEPHONE ENCOUNTER
----- Message from MICHAEL Martinez sent at 3/2/2022  8:57 AM EST -----  Please let patient know that her stool tests were negative.  Shift in her CBC may suggest she could have had viral illness?.  I would really recommend a more bland diet for now, low fat and avoid fried foods.  If symptoms persist, we will send her to GI.

## 2022-05-01 ENCOUNTER — HOSPITAL ENCOUNTER (EMERGENCY)
Dept: HOSPITAL 49 - FER | Age: 27
LOS: 1 days | Discharge: HOME | End: 2022-05-02
Payer: COMMERCIAL

## 2022-05-01 DIAGNOSIS — M54.50: ICD-10-CM

## 2022-05-01 DIAGNOSIS — I10: ICD-10-CM
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
## 2022-05-01 DIAGNOSIS — M54.2: Primary | ICD-10-CM
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
## 2022-05-01 DIAGNOSIS — M54.6: ICD-10-CM

## 2022-05-01 DIAGNOSIS — R07.89: ICD-10-CM

## 2022-05-01 DIAGNOSIS — Z79.899: ICD-10-CM

## 2022-05-01 LAB
ALBUMIN SERPL-MCNC: 3.3 G/DL (ref 3.4–5)
ALKALINE PHOSHATASE: 69 U/L (ref 46–116)
ALT SERPL-CCNC: 22 U/L (ref 14–59)
AST: 22 U/L (ref 15–37)
BASOPHIL: 0.6 % (ref 0–2)
BILIRUBIN - TOTAL: 0.4 MG/DL (ref 0.2–1)
BUN SERPL-MCNC: 11 MG/DL (ref 7–18)
BUN/CREAT RATIO (CALC): 14.1 RATIO
CHLORIDE: 106 MMOL/L (ref 98–107)
CO2 (BICARBONATE): 27 MMOL/L (ref 21–32)
CREATININE: 0.78 MG/DL (ref 0.51–0.95)
EOSINOPHIL: 0.9 % (ref 0–5)
GLOBULIN (CALCULATION): 3.2 G/DL
GLUCOSE SERPL-MCNC: 93 MG/DL (ref 74–106)
HCT: 38.9 % (ref 37–47)
HGB BLD-MCNC: 13.3 G/DL (ref 12.5–16)
LYMPHOCYTE: 31.7 % (ref 15–48)
MCH RBC QN AUTO: 30.3 PG (ref 25–31)
MCHC RBC AUTO-ENTMCNC: 34.2 G/DL (ref 32–36)
MCV: 88.6 FL (ref 78–100)
MONOCYTE: 8.1 % (ref 0–12)
MPV: 9.8 FL (ref 6–9.5)
NEUTROPHIL: 58.5 % (ref 41–80)
NRBC: 0
PLT: 301 K/UL (ref 150–400)
POTASSIUM: 3.8 MMOL/L (ref 3.5–5.1)
RBC MORPHOLOGY: NORMAL
RBC: 4.39 M/UL (ref 4.2–5.4)
RDW: 12.1 % (ref 11.5–14)
TOTAL PROTEIN: 6.5 G/DL (ref 6.4–8.2)
WBC: 9.6 K/UL (ref 4–10.5)

## 2022-05-02 LAB
BILIRUBIN: NEGATIVE MG/DL
BLOOD: NEGATIVE ERY/UL
CLARITY UR: CLEAR
COLOR: YELLOW
GLUCOSE (U): NORMAL MG/DL
LEUKOCYTES: NEGATIVE LEU/UL
NITRITE: NEGATIVE MG/DL
PROTEIN: NEGATIVE MG/DL
SPECIFIC GRAVITY: 1.02 (ref 1–1.03)
UROBILINOGEN: 0.2 MG/DL (ref 0.2–1)